# Patient Record
Sex: FEMALE | Race: OTHER | HISPANIC OR LATINO | URBAN - METROPOLITAN AREA
[De-identification: names, ages, dates, MRNs, and addresses within clinical notes are randomized per-mention and may not be internally consistent; named-entity substitution may affect disease eponyms.]

---

## 2020-02-10 ENCOUNTER — OUTPATIENT (OUTPATIENT)
Dept: OUTPATIENT SERVICES | Facility: HOSPITAL | Age: 30
LOS: 1 days | End: 2020-02-10
Payer: COMMERCIAL

## 2020-02-10 DIAGNOSIS — O26.899 OTHER SPECIFIED PREGNANCY RELATED CONDITIONS, UNSPECIFIED TRIMESTER: ICD-10-CM

## 2020-02-10 DIAGNOSIS — Z3A.00 WEEKS OF GESTATION OF PREGNANCY NOT SPECIFIED: ICD-10-CM

## 2020-02-10 LAB
ALBUMIN SERPL ELPH-MCNC: 3.8 G/DL — SIGNIFICANT CHANGE UP (ref 3.3–5)
ALP SERPL-CCNC: 64 U/L — SIGNIFICANT CHANGE UP (ref 40–120)
ALT FLD-CCNC: 20 U/L — SIGNIFICANT CHANGE UP (ref 10–45)
ANION GAP SERPL CALC-SCNC: 14 MMOL/L — SIGNIFICANT CHANGE UP (ref 5–17)
APPEARANCE UR: CLEAR — SIGNIFICANT CHANGE UP
APTT BLD: 27 SEC — LOW (ref 27.5–36.3)
AST SERPL-CCNC: 19 U/L — SIGNIFICANT CHANGE UP (ref 10–40)
BACTERIA # UR AUTO: PRESENT /HPF
BASOPHILS # BLD AUTO: 0.05 K/UL — SIGNIFICANT CHANGE UP (ref 0–0.2)
BASOPHILS NFR BLD AUTO: 0.4 % — SIGNIFICANT CHANGE UP (ref 0–2)
BILIRUB SERPL-MCNC: 0.3 MG/DL — SIGNIFICANT CHANGE UP (ref 0.2–1.2)
BILIRUB UR-MCNC: NEGATIVE — SIGNIFICANT CHANGE UP
BUN SERPL-MCNC: 6 MG/DL — LOW (ref 7–23)
CALCIUM SERPL-MCNC: 9.8 MG/DL — SIGNIFICANT CHANGE UP (ref 8.4–10.5)
CHLORIDE SERPL-SCNC: 104 MMOL/L — SIGNIFICANT CHANGE UP (ref 96–108)
CO2 SERPL-SCNC: 20 MMOL/L — LOW (ref 22–31)
COLOR SPEC: YELLOW — SIGNIFICANT CHANGE UP
CREAT ?TM UR-MCNC: 68 MG/DL — SIGNIFICANT CHANGE UP
CREAT SERPL-MCNC: 0.58 MG/DL — SIGNIFICANT CHANGE UP (ref 0.5–1.3)
DIFF PNL FLD: ABNORMAL
EOSINOPHIL # BLD AUTO: 0.07 K/UL — SIGNIFICANT CHANGE UP (ref 0–0.5)
EOSINOPHIL NFR BLD AUTO: 0.6 % — SIGNIFICANT CHANGE UP (ref 0–6)
EPI CELLS # UR: SIGNIFICANT CHANGE UP /HPF (ref 0–5)
FIBRINOGEN PPP-MCNC: 596 MG/DL — HIGH (ref 258–438)
GLUCOSE BLDC GLUCOMTR-MCNC: 86 MG/DL — SIGNIFICANT CHANGE UP (ref 70–99)
GLUCOSE SERPL-MCNC: 83 MG/DL — SIGNIFICANT CHANGE UP (ref 70–99)
GLUCOSE UR QL: NEGATIVE — SIGNIFICANT CHANGE UP
HCT VFR BLD CALC: 36.3 % — SIGNIFICANT CHANGE UP (ref 34.5–45)
HGB BLD-MCNC: 11.4 G/DL — LOW (ref 11.5–15.5)
IMM GRANULOCYTES NFR BLD AUTO: 1.6 % — HIGH (ref 0–1.5)
INR BLD: 1.01 — SIGNIFICANT CHANGE UP (ref 0.88–1.16)
KETONES UR-MCNC: NEGATIVE — SIGNIFICANT CHANGE UP
LDH SERPL L TO P-CCNC: 188 U/L — SIGNIFICANT CHANGE UP (ref 50–242)
LEUKOCYTE ESTERASE UR-ACNC: ABNORMAL
LYMPHOCYTES # BLD AUTO: 1.97 K/UL — SIGNIFICANT CHANGE UP (ref 1–3.3)
LYMPHOCYTES # BLD AUTO: 15.9 % — SIGNIFICANT CHANGE UP (ref 13–44)
MCHC RBC-ENTMCNC: 26 PG — LOW (ref 27–34)
MCHC RBC-ENTMCNC: 31.4 GM/DL — LOW (ref 32–36)
MCV RBC AUTO: 82.9 FL — SIGNIFICANT CHANGE UP (ref 80–100)
MONOCYTES # BLD AUTO: 1.01 K/UL — HIGH (ref 0–0.9)
MONOCYTES NFR BLD AUTO: 8.2 % — SIGNIFICANT CHANGE UP (ref 2–14)
NEUTROPHILS # BLD AUTO: 9.08 K/UL — HIGH (ref 1.8–7.4)
NEUTROPHILS NFR BLD AUTO: 73.3 % — SIGNIFICANT CHANGE UP (ref 43–77)
NITRITE UR-MCNC: NEGATIVE — SIGNIFICANT CHANGE UP
NRBC # BLD: 0 /100 WBCS — SIGNIFICANT CHANGE UP (ref 0–0)
PH UR: 6 — SIGNIFICANT CHANGE UP (ref 5–8)
PLATELET # BLD AUTO: 366 K/UL — SIGNIFICANT CHANGE UP (ref 150–400)
POTASSIUM SERPL-MCNC: 4.3 MMOL/L — SIGNIFICANT CHANGE UP (ref 3.5–5.3)
POTASSIUM SERPL-SCNC: 4.3 MMOL/L — SIGNIFICANT CHANGE UP (ref 3.5–5.3)
PROT ?TM UR-MCNC: 7 MG/DL — SIGNIFICANT CHANGE UP (ref 0–12)
PROT SERPL-MCNC: 6.8 G/DL — SIGNIFICANT CHANGE UP (ref 6–8.3)
PROT UR-MCNC: NEGATIVE MG/DL — SIGNIFICANT CHANGE UP
PROT/CREAT UR-RTO: 0.1 RATIO — SIGNIFICANT CHANGE UP (ref 0–0.2)
PROTHROM AB SERPL-ACNC: 11.5 SEC — SIGNIFICANT CHANGE UP (ref 10–12.9)
RBC # BLD: 4.38 M/UL — SIGNIFICANT CHANGE UP (ref 3.8–5.2)
RBC # FLD: 15.9 % — HIGH (ref 10.3–14.5)
RBC CASTS # UR COMP ASSIST: < 5 /HPF — SIGNIFICANT CHANGE UP
RETICS #: 106.3 K/UL — SIGNIFICANT CHANGE UP (ref 25–125)
RETICS/RBC NFR: 2.5 % — SIGNIFICANT CHANGE UP (ref 0.5–2.5)
SODIUM SERPL-SCNC: 138 MMOL/L — SIGNIFICANT CHANGE UP (ref 135–145)
SP GR SPEC: 1.02 — SIGNIFICANT CHANGE UP (ref 1–1.03)
T4 AB SER-ACNC: 9.02 UG/DL — SIGNIFICANT CHANGE UP (ref 3.17–11.72)
TRANSFERRIN SERPL-MCNC: 373 MG/DL — HIGH (ref 200–360)
TSH SERPL-MCNC: 0.86 UIU/ML — SIGNIFICANT CHANGE UP (ref 0.35–4.94)
URATE SERPL-MCNC: 3.6 MG/DL — SIGNIFICANT CHANGE UP (ref 2.5–7)
UROBILINOGEN FLD QL: 0.2 E.U./DL — SIGNIFICANT CHANGE UP
WBC # BLD: 12.38 K/UL — HIGH (ref 3.8–10.5)
WBC # FLD AUTO: 12.38 K/UL — HIGH (ref 3.8–10.5)
WBC UR QL: < 5 /HPF — SIGNIFICANT CHANGE UP

## 2020-02-10 PROCEDURE — 36415 COLL VENOUS BLD VENIPUNCTURE: CPT

## 2020-02-10 PROCEDURE — 76818 FETAL BIOPHYS PROFILE W/NST: CPT

## 2020-02-10 PROCEDURE — 81001 URINALYSIS AUTO W/SCOPE: CPT

## 2020-02-10 PROCEDURE — 85045 AUTOMATED RETICULOCYTE COUNT: CPT

## 2020-02-10 PROCEDURE — 93005 ELECTROCARDIOGRAM TRACING: CPT

## 2020-02-10 PROCEDURE — 84436 ASSAY OF TOTAL THYROXINE: CPT

## 2020-02-10 PROCEDURE — 84443 ASSAY THYROID STIM HORMONE: CPT

## 2020-02-10 PROCEDURE — 85610 PROTHROMBIN TIME: CPT

## 2020-02-10 PROCEDURE — 83615 LACTATE (LD) (LDH) ENZYME: CPT

## 2020-02-10 PROCEDURE — 85730 THROMBOPLASTIN TIME PARTIAL: CPT

## 2020-02-10 PROCEDURE — 84156 ASSAY OF PROTEIN URINE: CPT

## 2020-02-10 PROCEDURE — 94760 N-INVAS EAR/PLS OXIMETRY 1: CPT

## 2020-02-10 PROCEDURE — 80053 COMPREHEN METABOLIC PANEL: CPT

## 2020-02-10 PROCEDURE — 85384 FIBRINOGEN ACTIVITY: CPT

## 2020-02-10 PROCEDURE — 82962 GLUCOSE BLOOD TEST: CPT

## 2020-02-10 PROCEDURE — 84550 ASSAY OF BLOOD/URIC ACID: CPT

## 2020-02-10 PROCEDURE — 93306 TTE W/DOPPLER COMPLETE: CPT | Mod: 26

## 2020-02-10 PROCEDURE — 82570 ASSAY OF URINE CREATININE: CPT

## 2020-02-10 PROCEDURE — 99214 OFFICE O/P EST MOD 30 MIN: CPT

## 2020-02-10 PROCEDURE — 93306 TTE W/DOPPLER COMPLETE: CPT

## 2020-02-10 PROCEDURE — 93010 ELECTROCARDIOGRAM REPORT: CPT

## 2020-02-10 PROCEDURE — 85025 COMPLETE CBC W/AUTO DIFF WBC: CPT

## 2020-02-10 PROCEDURE — 84466 ASSAY OF TRANSFERRIN: CPT

## 2020-02-10 NOTE — CHART NOTE - NSCHARTNOTEFT_GEN_A_CORE
Medicine Service consulted at 6:30pm for Ms. Fuentes who was sent into [facility] OB service for r/o pre-eclampsia. Pre-eclampsia was ruled out by OB team, who then consulted Cardiology team for tachycardia 106-140. EKG ordered with sinus tachycardia, echo WNL, TSH WNL. Medicine was reportedly consulted because no cardiology attending was available to see case? When I went to see Ms. Fuentes on OB floor at approx 8:30pm, spoke with Lalit Black and Yony who reported Pt was discharged at 6:30pm for close f/u with her PCP. Pt was called [phone], reports feeling "great", good understanding of today's workup, plans to call her OB for appt in AM.     Event discussed with Izabela. Medicine Service consulted at 6:30pm for Ms. Fuentes who was sent into [facility] OB service for r/o pre-eclampsia. Pre-eclampsia was ruled out by OB team, who then consulted Cardiology team for tachycardia 106-140. EKG ordered with sinus tachycardia, echo WNL, TSH WNL. Medicine was reportedly consulted because no cardiology attending was available to see case? When I went to see Ms. Fuentes on OB floor at approx 8:30pm, spoke with Lalit Black and Yony who reported Pt was discharged at 6:30pm for close f/u with her PCP. Pt was called [phone], reports feeling "great", good understanding of today's workup, plans to call her OB for appt in AM.

## 2020-07-05 ENCOUNTER — INPATIENT (INPATIENT)
Facility: HOSPITAL | Age: 30
LOS: 2 days | Discharge: HOME | End: 2020-07-08
Attending: SPECIALIST | Admitting: SPECIALIST
Payer: COMMERCIAL

## 2020-07-05 DIAGNOSIS — Z3A.00 WEEKS OF GESTATION OF PREGNANCY NOT SPECIFIED: ICD-10-CM

## 2020-07-05 DIAGNOSIS — O26.899 OTHER SPECIFIED PREGNANCY RELATED CONDITIONS, UNSPECIFIED TRIMESTER: ICD-10-CM

## 2020-07-06 VITALS — HEIGHT: 65 IN | WEIGHT: 264.55 LBS

## 2020-07-06 LAB
BASOPHILS # BLD AUTO: 0.05 K/UL — SIGNIFICANT CHANGE UP (ref 0–0.2)
BASOPHILS NFR BLD AUTO: 0.3 % — SIGNIFICANT CHANGE UP (ref 0–2)
EOSINOPHIL # BLD AUTO: 0.06 K/UL — SIGNIFICANT CHANGE UP (ref 0–0.5)
EOSINOPHIL NFR BLD AUTO: 0.4 % — SIGNIFICANT CHANGE UP (ref 0–6)
HCT VFR BLD CALC: 39.4 % — SIGNIFICANT CHANGE UP (ref 34.5–45)
HGB BLD-MCNC: 12.7 G/DL — SIGNIFICANT CHANGE UP (ref 11.5–15.5)
IMM GRANULOCYTES NFR BLD AUTO: 1 % — SIGNIFICANT CHANGE UP (ref 0–1.5)
LYMPHOCYTES # BLD AUTO: 13.5 % — SIGNIFICANT CHANGE UP (ref 13–44)
LYMPHOCYTES # BLD AUTO: 2.3 K/UL — SIGNIFICANT CHANGE UP (ref 1–3.3)
MCHC RBC-ENTMCNC: 26.3 PG — LOW (ref 27–34)
MCHC RBC-ENTMCNC: 32.2 GM/DL — SIGNIFICANT CHANGE UP (ref 32–36)
MCV RBC AUTO: 81.7 FL — SIGNIFICANT CHANGE UP (ref 80–100)
MONOCYTES # BLD AUTO: 1.06 K/UL — HIGH (ref 0–0.9)
MONOCYTES NFR BLD AUTO: 6.2 % — SIGNIFICANT CHANGE UP (ref 2–14)
NEUTROPHILS # BLD AUTO: 13.38 K/UL — HIGH (ref 1.8–7.4)
NEUTROPHILS NFR BLD AUTO: 78.6 % — HIGH (ref 43–77)
NRBC # BLD: 0 /100 WBCS — SIGNIFICANT CHANGE UP (ref 0–0)
PLATELET # BLD AUTO: 355 K/UL — SIGNIFICANT CHANGE UP (ref 150–400)
RBC # BLD: 4.82 M/UL — SIGNIFICANT CHANGE UP (ref 3.8–5.2)
RBC # FLD: 16 % — HIGH (ref 10.3–14.5)
RH IG SCN BLD-IMP: POSITIVE — SIGNIFICANT CHANGE UP
SARS-COV-2 RNA SPEC QL NAA+PROBE: SIGNIFICANT CHANGE UP
T PALLIDUM AB TITR SER: NEGATIVE — SIGNIFICANT CHANGE UP
WBC # BLD: 17.02 K/UL — HIGH (ref 3.8–10.5)
WBC # FLD AUTO: 17.02 K/UL — HIGH (ref 3.8–10.5)

## 2020-07-06 RX ORDER — SODIUM CHLORIDE 9 MG/ML
3 INJECTION INTRAMUSCULAR; INTRAVENOUS; SUBCUTANEOUS EVERY 8 HOURS
Refills: 0 | Status: DISCONTINUED | OUTPATIENT
Start: 2020-07-06 | End: 2020-07-08

## 2020-07-06 RX ORDER — TETANUS TOXOID, REDUCED DIPHTHERIA TOXOID AND ACELLULAR PERTUSSIS VACCINE, ADSORBED 5; 2.5; 8; 8; 2.5 [IU]/.5ML; [IU]/.5ML; UG/.5ML; UG/.5ML; UG/.5ML
0.5 SUSPENSION INTRAMUSCULAR ONCE
Refills: 0 | Status: COMPLETED | OUTPATIENT
Start: 2020-07-06

## 2020-07-06 RX ORDER — OXYTOCIN 10 UNIT/ML
333.33 VIAL (ML) INJECTION
Qty: 20 | Refills: 0 | Status: DISCONTINUED | OUTPATIENT
Start: 2020-07-06 | End: 2020-07-06

## 2020-07-06 RX ORDER — IBUPROFEN 200 MG
600 TABLET ORAL EVERY 6 HOURS
Refills: 0 | Status: DISCONTINUED | OUTPATIENT
Start: 2020-07-06 | End: 2020-07-08

## 2020-07-06 RX ORDER — KETOROLAC TROMETHAMINE 30 MG/ML
30 SYRINGE (ML) INJECTION ONCE
Refills: 0 | Status: DISCONTINUED | OUTPATIENT
Start: 2020-07-06 | End: 2020-07-06

## 2020-07-06 RX ORDER — ACETAMINOPHEN 500 MG
975 TABLET ORAL EVERY 6 HOURS
Refills: 0 | Status: DISCONTINUED | OUTPATIENT
Start: 2020-07-06 | End: 2020-07-08

## 2020-07-06 RX ORDER — DIPHENHYDRAMINE HCL 50 MG
25 CAPSULE ORAL EVERY 6 HOURS
Refills: 0 | Status: DISCONTINUED | OUTPATIENT
Start: 2020-07-06 | End: 2020-07-08

## 2020-07-06 RX ORDER — OXYTOCIN 10 UNIT/ML
333.33 VIAL (ML) INJECTION
Qty: 20 | Refills: 0 | Status: DISCONTINUED | OUTPATIENT
Start: 2020-07-06 | End: 2020-07-08

## 2020-07-06 RX ORDER — AER TRAVELER 0.5 G/1
1 SOLUTION RECTAL; TOPICAL EVERY 4 HOURS
Refills: 0 | Status: DISCONTINUED | OUTPATIENT
Start: 2020-07-06 | End: 2020-07-08

## 2020-07-06 RX ORDER — BENZOCAINE 10 %
1 GEL (GRAM) MUCOUS MEMBRANE EVERY 6 HOURS
Refills: 0 | Status: DISCONTINUED | OUTPATIENT
Start: 2020-07-06 | End: 2020-07-08

## 2020-07-06 RX ORDER — SIMETHICONE 80 MG/1
80 TABLET, CHEWABLE ORAL EVERY 4 HOURS
Refills: 0 | Status: DISCONTINUED | OUTPATIENT
Start: 2020-07-06 | End: 2020-07-08

## 2020-07-06 RX ORDER — CEFAZOLIN SODIUM 1 G
VIAL (EA) INJECTION
Refills: 0 | Status: DISCONTINUED | OUTPATIENT
Start: 2020-07-06 | End: 2020-07-06

## 2020-07-06 RX ORDER — FENTANYL/BUPIVACAINE/NS/PF 2MCG/ML-.1
250 PLASTIC BAG, INJECTION (ML) INJECTION
Refills: 0 | Status: DISCONTINUED | OUTPATIENT
Start: 2020-07-06 | End: 2020-07-06

## 2020-07-06 RX ORDER — CEFAZOLIN SODIUM 1 G
2000 VIAL (EA) INJECTION ONCE
Refills: 0 | Status: COMPLETED | OUTPATIENT
Start: 2020-07-06 | End: 2020-07-06

## 2020-07-06 RX ORDER — MAGNESIUM HYDROXIDE 400 MG/1
30 TABLET, CHEWABLE ORAL
Refills: 0 | Status: DISCONTINUED | OUTPATIENT
Start: 2020-07-06 | End: 2020-07-08

## 2020-07-06 RX ORDER — SODIUM CHLORIDE 9 MG/ML
1000 INJECTION, SOLUTION INTRAVENOUS
Refills: 0 | Status: DISCONTINUED | OUTPATIENT
Start: 2020-07-06 | End: 2020-07-06

## 2020-07-06 RX ORDER — CITRIC ACID/SODIUM CITRATE 300-500 MG
15 SOLUTION, ORAL ORAL EVERY 6 HOURS
Refills: 0 | Status: DISCONTINUED | OUTPATIENT
Start: 2020-07-06 | End: 2020-07-06

## 2020-07-06 RX ORDER — DIBUCAINE 1 %
1 OINTMENT (GRAM) RECTAL EVERY 6 HOURS
Refills: 0 | Status: DISCONTINUED | OUTPATIENT
Start: 2020-07-06 | End: 2020-07-08

## 2020-07-06 RX ORDER — CEFAZOLIN SODIUM 1 G
1000 VIAL (EA) INJECTION EVERY 8 HOURS
Refills: 0 | Status: DISCONTINUED | OUTPATIENT
Start: 2020-07-06 | End: 2020-07-06

## 2020-07-06 RX ORDER — PRAMOXINE HYDROCHLORIDE 150 MG/15G
1 AEROSOL, FOAM RECTAL EVERY 4 HOURS
Refills: 0 | Status: DISCONTINUED | OUTPATIENT
Start: 2020-07-06 | End: 2020-07-08

## 2020-07-06 RX ORDER — LANOLIN
1 OINTMENT (GRAM) TOPICAL EVERY 6 HOURS
Refills: 0 | Status: DISCONTINUED | OUTPATIENT
Start: 2020-07-06 | End: 2020-07-08

## 2020-07-06 RX ORDER — OXYCODONE HYDROCHLORIDE 5 MG/1
5 TABLET ORAL
Refills: 0 | Status: DISCONTINUED | OUTPATIENT
Start: 2020-07-06 | End: 2020-07-08

## 2020-07-06 RX ORDER — OXYCODONE HYDROCHLORIDE 5 MG/1
5 TABLET ORAL ONCE
Refills: 0 | Status: DISCONTINUED | OUTPATIENT
Start: 2020-07-06 | End: 2020-07-08

## 2020-07-06 RX ORDER — HYDROCORTISONE 1 %
1 OINTMENT (GRAM) TOPICAL EVERY 6 HOURS
Refills: 0 | Status: DISCONTINUED | OUTPATIENT
Start: 2020-07-06 | End: 2020-07-08

## 2020-07-06 RX ORDER — TETANUS TOXOID, REDUCED DIPHTHERIA TOXOID AND ACELLULAR PERTUSSIS VACCINE, ADSORBED 5; 2.5; 8; 8; 2.5 [IU]/.5ML; [IU]/.5ML; UG/.5ML; UG/.5ML; UG/.5ML
0.5 SUSPENSION INTRAMUSCULAR ONCE
Refills: 0 | Status: COMPLETED | OUTPATIENT
Start: 2020-07-06 | End: 2020-07-06

## 2020-07-06 RX ADMIN — Medication 250 MILLILITER(S): at 02:00

## 2020-07-06 RX ADMIN — Medication 975 MILLIGRAM(S): at 09:37

## 2020-07-06 RX ADMIN — Medication 1 TABLET(S): at 11:33

## 2020-07-06 RX ADMIN — Medication 975 MILLIGRAM(S): at 15:00

## 2020-07-06 RX ADMIN — SODIUM CHLORIDE 125 MILLILITER(S): 9 INJECTION, SOLUTION INTRAVENOUS at 02:33

## 2020-07-06 RX ADMIN — Medication 250 MILLILITER(S): at 02:30

## 2020-07-06 RX ADMIN — SODIUM CHLORIDE 3 MILLILITER(S): 9 INJECTION INTRAMUSCULAR; INTRAVENOUS; SUBCUTANEOUS at 15:08

## 2020-07-06 RX ADMIN — Medication 975 MILLIGRAM(S): at 21:03

## 2020-07-06 RX ADMIN — Medication 100 MILLIGRAM(S): at 00:51

## 2020-07-06 RX ADMIN — Medication 1000 MILLIUNIT(S)/MIN: at 06:30

## 2020-07-06 RX ADMIN — Medication 600 MILLIGRAM(S): at 18:00

## 2020-07-06 RX ADMIN — SODIUM CHLORIDE 125 MILLILITER(S): 9 INJECTION, SOLUTION INTRAVENOUS at 01:24

## 2020-07-06 RX ADMIN — SODIUM CHLORIDE 125 MILLILITER(S): 9 INJECTION, SOLUTION INTRAVENOUS at 00:30

## 2020-07-06 RX ADMIN — TETANUS TOXOID, REDUCED DIPHTHERIA TOXOID AND ACELLULAR PERTUSSIS VACCINE, ADSORBED 0.5 MILLILITER(S): 5; 2.5; 8; 8; 2.5 SUSPENSION INTRAMUSCULAR at 11:33

## 2020-07-06 RX ADMIN — Medication 600 MILLIGRAM(S): at 12:00

## 2020-07-06 RX ADMIN — SODIUM CHLORIDE 3 MILLILITER(S): 9 INJECTION INTRAMUSCULAR; INTRAVENOUS; SUBCUTANEOUS at 22:03

## 2020-07-06 RX ADMIN — Medication 30 MILLIGRAM(S): at 06:30

## 2020-07-06 NOTE — PATIENT PROFILE OB - PRO VDRL INFANT
Phyllis Morelos is a 31 y.o.  female with IUP at 40w1d weeks gestation admitted for induction of labor .  This IUP is complicated by MO, hx of complex ovarian cysts (likely endometrioma), fam hx of BRCA (patient not tested).  Patient reports contractions, denies vaginal bleeding, and LOF.   Fetal Movement: normal.    negative

## 2020-07-06 NOTE — LACTATION INITIAL EVALUATION - NS LACT CON REASON FOR REQ
primaparous mom/40 wk gestation baby in NICU for RDS d/t mec. Met the mother in pp room, provided support and reassurance to the parents. Basic breastfeeding education, incl. milk production feedback system, guidelines, and normal  behavior were explained. Mother with moderate spacing between breasts. Left breast appears smaller than right breast. Potential for insufficient milk supply discussed, but this will be determined as milk comes in more fully. Mother encouraged to remain hopeful about supply and ensure additional hand expression and massage at this time to support supply. Set mother up on breast pump and pumping instructions and kit care was taught. Manual expression also taught with proper return demo, and encouraged to be performed in addition to pumping to help support supply and aid in colostral collection. Encouraged frequent visitation and skin to skin contact with baby. Baby to be transferred to nursery today and mother was encouraged to call for our assistance for direct breastfeeding help as needed. All questions were answered, mother verbalized understanding of teaching and info given.

## 2020-07-07 ENCOUNTER — TRANSCRIPTION ENCOUNTER (OUTPATIENT)
Age: 30
End: 2020-07-07

## 2020-07-07 RX ORDER — BENZOCAINE 10 %
1 GEL (GRAM) MUCOUS MEMBRANE
Qty: 0 | Refills: 0 | DISCHARGE
Start: 2020-07-07

## 2020-07-07 RX ORDER — IBUPROFEN 200 MG
1 TABLET ORAL
Qty: 0 | Refills: 0 | DISCHARGE
Start: 2020-07-07

## 2020-07-07 RX ORDER — ACETAMINOPHEN 500 MG
3 TABLET ORAL
Qty: 0 | Refills: 0 | DISCHARGE
Start: 2020-07-07

## 2020-07-07 RX ADMIN — Medication 1 TABLET(S): at 12:15

## 2020-07-07 RX ADMIN — Medication 600 MILLIGRAM(S): at 18:16

## 2020-07-07 RX ADMIN — Medication 975 MILLIGRAM(S): at 09:16

## 2020-07-07 RX ADMIN — Medication 975 MILLIGRAM(S): at 15:11

## 2020-07-07 RX ADMIN — Medication 600 MILLIGRAM(S): at 06:07

## 2020-07-07 RX ADMIN — Medication 600 MILLIGRAM(S): at 00:01

## 2020-07-07 RX ADMIN — Medication 600 MILLIGRAM(S): at 12:15

## 2020-07-07 NOTE — DISCHARGE NOTE OB - PATIENT PORTAL LINK FT
You can access the FollowMyHealth Patient Portal offered by [facility] by registering at the following website: [email or web address]. By joining [employer]’s FollowMyHealth portal, you will also be able to view your health information using other applications (apps) compatible with our system.

## 2020-07-07 NOTE — PROGRESS NOTE ADULT - ASSESSMENT
A/P yo 29y  s/p , PP#1 , stable, meeting postpartum milestones  1. Pain: controlled on tylenol and motrin  2. GI: Tolerating regular diet  3. :  Voiding spontaneously without pain or difficulty  4. DVT prophylaxis: SCDs, ambulation  5. Dispo: PP#1 unless otherwise specified

## 2020-07-07 NOTE — PROGRESS NOTE ADULT - SUBJECTIVE AND OBJECTIVE BOX
Patient evaluated at bedside.  No acute events overnight.    She reports pain is well controlled with medications.     She has been ambulating without assistance and is voiding spontaneously. Reports decrease in vaginal bleeding and denies clots.     She denies HA, dizziness, chest pain, palpitations, shortness of breath, n/v, heavy vaginal bleeding or perineal discomfort.    Physical Exam:  Vital Signs Last 24 Hrs  T(C): 36.8 (07 Jul 2020 02:00), Max: 37 (06 Jul 2020 07:15)  T(F): 98.3 (07 Jul 2020 02:00), Max: 98.6 (06 Jul 2020 07:15)  HR: 80 (07 Jul 2020 02:00) (76 - 106)  BP: 103/65 (07 Jul 2020 02:00) (103/65 - 126/69)  BP(mean): --  RR: 18 (07 Jul 2020 02:00) (17 - 18)  SpO2: 96% (07 Jul 2020 02:00) (96% - 100%)    GA: NAD, A+0 x 3  Abd: + BS, soft, appropriately tender, nondistended, no rebound or guarding, uterus firm at midline  : lochia WNL  Extremities: no edema or calf tenderness                          12.7   17.02 )-----------( 355      ( 06 Jul 2020 00:39 )             39.4

## 2020-07-07 NOTE — DISCHARGE NOTE OB - CARE PROVIDER_API CALL
Dwight Khoury  OBSTETRICS AND GYNECOLOGY  [address] [address]  Phone: [phone]  Fax: [phone]  Follow Up Time:

## 2020-07-08 VITALS
DIASTOLIC BLOOD PRESSURE: 75 MMHG | SYSTOLIC BLOOD PRESSURE: 119 MMHG | HEART RATE: 74 BPM | OXYGEN SATURATION: 97 % | TEMPERATURE: 98 F | RESPIRATION RATE: 18 BRPM

## 2020-07-08 PROCEDURE — 85025 COMPLETE CBC W/AUTO DIFF WBC: CPT

## 2020-07-08 PROCEDURE — 99214 OFFICE O/P EST MOD 30 MIN: CPT

## 2020-07-08 PROCEDURE — 90715 TDAP VACCINE 7 YRS/> IM: CPT

## 2020-07-08 PROCEDURE — 36415 COLL VENOUS BLD VENIPUNCTURE: CPT

## 2020-07-08 PROCEDURE — 86850 RBC ANTIBODY SCREEN: CPT

## 2020-07-08 PROCEDURE — 87635 SARS-COV-2 COVID-19 AMP PRB: CPT

## 2020-07-08 PROCEDURE — 86780 TREPONEMA PALLIDUM: CPT

## 2020-07-08 PROCEDURE — 86901 BLOOD TYPING SEROLOGIC RH(D): CPT

## 2020-07-08 RX ADMIN — Medication 600 MILLIGRAM(S): at 11:32

## 2020-07-08 RX ADMIN — Medication 975 MILLIGRAM(S): at 14:55

## 2020-07-08 RX ADMIN — Medication 975 MILLIGRAM(S): at 08:54

## 2020-07-08 RX ADMIN — Medication 1 APPLICATION(S): at 11:32

## 2020-07-08 RX ADMIN — Medication 1 TABLET(S): at 11:32

## 2020-07-08 RX ADMIN — Medication 975 MILLIGRAM(S): at 04:34

## 2020-07-08 RX ADMIN — Medication 1 SPRAY(S): at 11:32

## 2020-07-08 NOTE — PROGRESS NOTE ADULT - SUBJECTIVE AND OBJECTIVE BOX
Patient evaluated at bedside.  No acute events overnight.    She reports pain is well controlled with medications.     She has been ambulating without assistance and is voiding spontaneously. Reports decrease in vaginal bleeding and denies clots.     She denies HA, dizziness, chest pain, palpitations, shortness of breath, n/v, heavy vaginal bleeding or perineal discomfort.    Physical Exam:  Vital Signs Last 24 Hrs  T(C): 36.5 (07 Jul 2020 18:23), Max: 37 (07 Jul 2020 09:02)  T(F): 97.7 (07 Jul 2020 18:23), Max: 98.6 (07 Jul 2020 09:02)  HR: 97 (07 Jul 2020 18:23) (80 - 97)  BP: 127/85 (07 Jul 2020 18:23) (122/84 - 127/85)  BP(mean): --  RR: 17 (07 Jul 2020 18:23) (17 - 18)  SpO2: 97% (07 Jul 2020 18:23) (97% - 97%)    GA: NAD, A+0 x 3  Abd: + BS, soft, appropriately tender, nondistended, no rebound or guarding, uterus firm at midline  : lochia WNL  Extremities: no edema or calf tenderness

## 2020-07-08 NOTE — PROGRESS NOTE ADULT - ASSESSMENT
A/P yo 29y  s/p , PP#2 , stable, meeting postpartum milestones  1. Pain: controlled on tylenol and motrin  2. GI: Tolerating regular diet  3. :  Voiding spontaneously without pain or difficulty  4. DVT prophylaxis: ambulating well, no SCDs needed at this time   5. Dispo: PP#2 unless otherwise specified

## 2020-07-11 DIAGNOSIS — Z3A.40 40 WEEKS GESTATION OF PREGNANCY: ICD-10-CM

## 2020-07-11 DIAGNOSIS — Z90.49 ACQUIRED ABSENCE OF OTHER SPECIFIED PARTS OF DIGESTIVE TRACT: ICD-10-CM

## 2020-07-11 DIAGNOSIS — Z34.83 ENCOUNTER FOR SUPERVISION OF OTHER NORMAL PREGNANCY, THIRD TRIMESTER: ICD-10-CM

## 2020-07-11 DIAGNOSIS — Z88.0 ALLERGY STATUS TO PENICILLIN: ICD-10-CM

## 2021-11-17 NOTE — PATIENT PROFILE OB - BREASTFEEDING PROVIDES STABLE TEMPERATURE THROUGH SKIN TO SKIN CONTACT
Contraception Choices  Contraception, also called birth control, refers to methods or devices that prevent pregnancy.  Hormonal methods  Contraceptive implant    A contraceptive implant is a thin, plastic tube that contains a hormone. It is inserted into the upper part of the arm. It can remain in place for up to 3 years.  Progestin-only injections  Progestin-only injections are injections of progestin, a synthetic form of the hormone progesterone. They are given every 3 months by a health care provider.  Birth control pills    Birth control pills are pills that contain hormones that prevent pregnancy. They must be taken once a day, preferably at the same time each day.  Birth control patch    The birth control patch contains hormones that prevent pregnancy. It is placed on the skin and must be changed once a week for three weeks and removed on the fourth week. A prescription is needed to use this method of contraception.  Vaginal ring    A vaginal ring contains hormones that prevent pregnancy. It is placed in the vagina for three weeks and removed on the fourth week. After that, the process is repeated with a new ring. A prescription is needed to use this method of contraception.  Emergency contraceptive  Emergency contraceptives prevent pregnancy after unprotected sex. They come in pill form and can be taken up to 5 days after sex. They work best the sooner they are taken after having sex. Most emergency contraceptives are available without a prescription. This method should not be used as your only form of birth control.  Barrier methods  Male condom    A male condom is a thin sheath that is worn over the penis during sex. Condoms keep sperm from going inside a woman's body. They can be used with a spermicide to increase their effectiveness. They should be disposed after a single use.  Female condom    A female condom is a soft, loose-fitting sheath that is put into the vagina before sex. The condom keeps sperm  from going inside a woman's body. They should be disposed after a single use.  Diaphragm    A diaphragm is a soft, dome-shaped barrier. It is inserted into the vagina before sex, along with a spermicide. The diaphragm blocks sperm from entering the uterus, and the spermicide kills sperm. A diaphragm should be left in the vagina for 6-8 hours after sex and removed within 24 hours.  A diaphragm is prescribed and fitted by a health care provider. A diaphragm should be replaced every 1-2 years, after giving birth, after gaining more than 15 lb (6.8 kg), and after pelvic surgery.  Cervical cap    A cervical cap is a round, soft latex or plastic cup that fits over the cervix. It is inserted into the vagina before sex, along with spermicide. It blocks sperm from entering the uterus. The cap should be left in place for 6-8 hours after sex and removed within 48 hours. A cervical cap must be prescribed and fitted by a health care provider. It should be replaced every 2 years.  Sponge    A sponge is a soft, circular piece of polyurethane foam with spermicide on it. The sponge helps block sperm from entering the uterus, and the spermicide kills sperm. To use it, you make it wet and then insert it into the vagina. It should be inserted before sex, left in for at least 6 hours after sex, and removed and thrown away within 30 hours.  Spermicides  Spermicides are chemicals that kill or block sperm from entering the cervix and uterus. They can come as a cream, jelly, suppository, foam, or tablet. A spermicide should be inserted into the vagina with an applicator at least 10-15 minutes before sex to allow time for it to work. The process must be repeated every time you have sex. Spermicides do not require a prescription.  Intrauterine contraception  Intrauterine device (IUD)  An IUD is a T-shaped device that is put in a woman's uterus. There are two types:  · Hormone IUD.This type contains progestin, a synthetic form of the hormone  progesterone. This type can stay in place for 3-5 years.  · Copper IUD.This type is wrapped in copper wire. It can stay in place for 10 years.    Permanent methods of contraception  Female tubal ligation  In this method, a woman's fallopian tubes are sealed, tied, or blocked during surgery to prevent eggs from traveling to the uterus.  Hysteroscopic sterilization  In this method, a small, flexible insert is placed into each fallopian tube. The inserts cause scar tissue to form in the fallopian tubes and block them, so sperm cannot reach an egg. The procedure takes about 3 months to be effective. Another form of birth control must be used during those 3 months.  Male sterilization  This is a procedure to tie off the tubes that carry sperm (vasectomy). After the procedure, the man can still ejaculate fluid (semen).  Natural planning methods  Natural family planning  In this method, a couple does not have sex on days when the woman could become pregnant.  Calendar method  This means keeping track of the length of each menstrual cycle, identifying the days when pregnancy can happen, and not having sex on those days.  Ovulation method  In this method, a couple avoids sex during ovulation.  Symptothermal method  This method involves not having sex during ovulation. The woman typically checks for ovulation by watching changes in her temperature and in the consistency of cervical mucus.  Post-ovulation method  In this method, a couple waits to have sex until after ovulation.  Summary  · Contraception, also called birth control, means methods or devices that prevent pregnancy.  · Hormonal methods of contraception include implants, injections, pills, patches, vaginal rings, and emergency contraceptives.  · Barrier methods of contraception can include male condoms, female condoms, diaphragms, cervical caps, sponges, and spermicides.  · There are two types of IUDs (intrauterine devices). An IUD can be put in a woman's uterus to  prevent pregnancy for 3-5 years.  · Permanent sterilization can be done through a procedure for males, females, or both.  · Natural family planning methods involve not having sex on days when the woman could become pregnant.  This information is not intended to replace advice given to you by your health care provider. Make sure you discuss any questions you have with your health care provider.  Document Released: 2006 Document Revised: 12/20/2018 Document Reviewed: 01/20/2018  Elsevier Patient Education © 2020 [employer]     Statement Selected

## 2023-01-11 ENCOUNTER — NON-APPOINTMENT (OUTPATIENT)
Age: 33
End: 2023-01-11

## 2023-01-11 PROBLEM — Z00.00 ENCOUNTER FOR PREVENTIVE HEALTH EXAMINATION: Status: ACTIVE | Noted: 2023-01-11

## 2023-01-12 ENCOUNTER — APPOINTMENT (OUTPATIENT)
Dept: OTOLARYNGOLOGY | Facility: CLINIC | Age: 33
End: 2023-01-12
Payer: COMMERCIAL

## 2023-01-12 VITALS — TEMPERATURE: 97.2 F | BODY MASS INDEX: 38.32 KG/M2 | HEIGHT: 65 IN | WEIGHT: 230 LBS

## 2023-01-12 DIAGNOSIS — Z78.9 OTHER SPECIFIED HEALTH STATUS: ICD-10-CM

## 2023-01-12 PROCEDURE — 99203 OFFICE O/P NEW LOW 30 MIN: CPT | Mod: 25

## 2023-01-12 PROCEDURE — 69210 REMOVE IMPACTED EAR WAX UNI: CPT

## 2023-01-12 RX ORDER — CIPROFLOXACIN AND DEXAMETHASONE 3; 1 MG/ML; MG/ML
0.3-0.1 SUSPENSION/ DROPS AURICULAR (OTIC)
Refills: 0 | Status: ACTIVE | COMMUNITY

## 2023-01-12 NOTE — PHYSICAL EXAM
[FreeTextEntry1] : Microscopic ear exam with cerumen debridement:\par \par Right ear: The ear canal was patent and nonobstructed.  The tympanic membrane was intact and noninflamed.\par \par Left ear: Obstructing cerumen and fungal debris was debrided from the ear canal using suction.  The ear canal was inflamed.   The tympanic membrane was apparently intact with mild surface inflammation.   [Midline] : trachea located in midline position [Normal] : no rashes

## 2023-01-12 NOTE — REASON FOR VISIT
[Initial Evaluation] : an initial evaluation for [Hearing Loss] : hearing loss [FreeTextEntry2] : ear infection

## 2023-01-12 NOTE — CONSULT LETTER
[Please see my note below.] : Please see my note below. [FreeTextEntry2] : Dear JOSELYN DEMARCO  [FreeTextEntry1] : Thank you for allowing me to participate in the care of TRISTAN STEPHEN .\par Please see the attached visit note.\par \par \par \par Juan Carlos Robin\par Otology\par Medical Director of [facility]\par Department of Otolaryngology\par [facility]

## 2023-01-12 NOTE — HISTORY OF PRESENT ILLNESS
[de-identified] : TRISTAN STEPHEN has a history of left ear fullness. treated last week after ear pain. Treated with otic drops.  Pain has resolved.  No prior ear disease. Itching in the ears reported with occasional manipulation.

## 2023-01-12 NOTE — ASSESSMENT
[FreeTextEntry1] : Acute inflammation of the left ear canal recently treated with Cipro based eardrops.  Pain resolved but fullness remains.  Today's findings include fungal debris.\par \par She was treated today with debridement.  I have placed her on antifungal otic drops and have recommended follow-up in 1 week.\par \par Ear hygiene reviewed.

## 2023-01-17 ENCOUNTER — APPOINTMENT (OUTPATIENT)
Dept: OTOLARYNGOLOGY | Facility: CLINIC | Age: 33
End: 2023-01-17
Payer: COMMERCIAL

## 2023-01-17 VITALS — BODY MASS INDEX: 38.32 KG/M2 | TEMPERATURE: 97.3 F | HEIGHT: 65 IN | WEIGHT: 230 LBS

## 2023-01-17 PROCEDURE — 92504 EAR MICROSCOPY EXAMINATION: CPT

## 2023-01-17 PROCEDURE — 99213 OFFICE O/P EST LOW 20 MIN: CPT | Mod: 25

## 2023-01-17 NOTE — HISTORY OF PRESENT ILLNESS
[de-identified] : TRISTAN STEPHEN has a history of left ear fullness. treated last week after ear pain. Treated with otic drops.  Pain has resolved.  No prior ear disease. Itching in the ears reported with occasional manipulation.  [FreeTextEntry1] : 01/17/2023 \par Improved symptoms in the left ear following the use of otic drops.  No pain or otorrhea reported.  Now reports mild pain in the right ear.  No otorrhea.  Compliant with ear hygiene

## 2023-01-17 NOTE — PHYSICAL EXAM
[Normal] : temporomandibular joint is normal [FreeTextEntry1] : Procedure: Microscopic Ear Exam\par \par Left ear:  \par Mild erythema of the ear canal.  Resolving granulation of the tympanic membrane surface with minimal residual inflammation.\par \par Right ear:  \par Erythema with minimal fungal elements retained.  The tympanic membrane appears normal

## 2023-01-17 NOTE — ASSESSMENT
[FreeTextEntry1] : Improved fungal infection of the left ear canal.  Early signs of fungal infection in the right ear noted.  Ear hygiene carefully reviewed.  Otic drops use recommended in the right ear and tapered off of the left.\par \par Follow-up recommended in 2 weeks.  Ear hygiene reviewed in detail.

## 2023-01-17 NOTE — CONSULT LETTER
[Please see my note below.] : Please see my note below. [FreeTextEntry2] : Dear JOSELYN DEMARCO  [FreeTextEntry1] : Thank you for allowing me to participate in the care of TRISTAN STEPHEN .\par Please see the attached visit note.\par \par \par \par Juan Carlos Robin\par Otology\par Medical Director of [facility]\par Department of Otolaryngology\par

## 2023-01-31 ENCOUNTER — APPOINTMENT (OUTPATIENT)
Dept: OTOLARYNGOLOGY | Facility: CLINIC | Age: 33
End: 2023-01-31
Payer: COMMERCIAL

## 2023-01-31 VITALS — TEMPERATURE: 97.3 F | BODY MASS INDEX: 38.32 KG/M2 | HEIGHT: 65 IN | WEIGHT: 230 LBS

## 2023-01-31 DIAGNOSIS — H60.311 DIFFUSE OTITIS EXTERNA, RIGHT EAR: ICD-10-CM

## 2023-01-31 DIAGNOSIS — H60.312 DIFFUSE OTITIS EXTERNA, LEFT EAR: ICD-10-CM

## 2023-01-31 DIAGNOSIS — H61.20 IMPACTED CERUMEN, UNSPECIFIED EAR: ICD-10-CM

## 2023-01-31 PROCEDURE — 69210 REMOVE IMPACTED EAR WAX UNI: CPT

## 2023-01-31 PROCEDURE — 99214 OFFICE O/P EST MOD 30 MIN: CPT | Mod: 25

## 2023-01-31 RX ORDER — CLOTRIMAZOLE 10 MG/ML
1 SOLUTION TOPICAL
Qty: 30 | Refills: 0 | Status: ACTIVE | COMMUNITY
Start: 2023-01-12 | End: 1900-01-01

## 2023-01-31 RX ORDER — FLUCONAZOLE 150 MG/1
150 TABLET ORAL
Qty: 8 | Refills: 0 | Status: ACTIVE | COMMUNITY
Start: 2023-01-31 | End: 1900-01-01

## 2023-01-31 NOTE — ASSESSMENT
[FreeTextEntry1] : Refractory infection of the ears canals which appears to be fungal.  This was treated today with debridement and topical gentian violet.\par \par I have recommended dry ear precautions and continued use of clotrimazole.  I have placed her on oral Diflucan and have recommended follow-up in 2 weeks.

## 2023-01-31 NOTE — PHYSICAL EXAM
[Normal] : temporomandibular joint is normal [FreeTextEntry1] : Microscopic ear exam with cerumen debridement:\par \par Right ear: Obstructing fungal debris was debrided from the ear canal using suction.  The ear canal was mildly inflammed. The tympanic membrane was intact and noninflamed.\par \par Left ear: The ear canal was patent and nonobstructed.  Mild inflammation.  The tympanic membrane was intact and noninflamed.\par \par Both ears were treated with topical gentian violet.

## 2023-01-31 NOTE — CONSULT LETTER
[Please see my note below.] : Please see my note below. [FreeTextEntry2] : Dear JOSELYN DEMARCO  [FreeTextEntry1] : Thank you for allowing me to participate in the care of TRISTAN STEPHEN .\par Please see the attached visit note.\par \par \par \par Juan Carlos Robin\par Otology\par Medical Director of [facility]\par Department of Otolaryngology\par [facility]

## 2023-01-31 NOTE — HISTORY OF PRESENT ILLNESS
[de-identified] : TRISTAN STEPHEN has a history of left ear fullness. treated last week after ear pain. Treated with otic drops.  Pain has resolved.  No prior ear disease. Itching in the ears reported with occasional manipulation.  [FreeTextEntry1] : 01/31/2023 \par Occasional right ear pain repoted. No otorrhea. Fullness in the right ear reported.  Used clotrimazole drops.

## 2023-02-15 ENCOUNTER — APPOINTMENT (OUTPATIENT)
Dept: OTOLARYNGOLOGY | Facility: CLINIC | Age: 33
End: 2023-02-15

## 2023-04-04 ENCOUNTER — APPOINTMENT (OUTPATIENT)
Dept: OTOLARYNGOLOGY | Facility: CLINIC | Age: 33
End: 2023-04-04

## 2024-12-06 NOTE — PATIENT PROFILE OB - MEDICAL/SURG HX
Requested Prescriptions     Pending Prescriptions Disp Refills    pravastatin (PRAVACHOL) 20 MG tablet [Pharmacy Med Name: PRAVASTATIN 20MG TABLETS] 90 tablet 3     Sig: TAKE 1 TABLET BY MOUTH DAILY     Dose verified and to [place]. Patient is scheduled for follow up visit.   
For Medical Surgical Hx obtained at Admission, Please see Provider H&P